# Patient Record
Sex: MALE | Race: WHITE | NOT HISPANIC OR LATINO | URBAN - METROPOLITAN AREA
[De-identification: names, ages, dates, MRNs, and addresses within clinical notes are randomized per-mention and may not be internally consistent; named-entity substitution may affect disease eponyms.]

---

## 2019-08-02 ENCOUNTER — EMERGENCY (EMERGENCY)
Facility: HOSPITAL | Age: 19
LOS: 1 days | Discharge: HOME | End: 2019-08-02
Attending: EMERGENCY MEDICINE
Payer: COMMERCIAL

## 2019-08-02 VITALS
RESPIRATION RATE: 16 BRPM | SYSTOLIC BLOOD PRESSURE: 139 MMHG | DIASTOLIC BLOOD PRESSURE: 82 MMHG | HEART RATE: 106 BPM | TEMPERATURE: 99 F | OXYGEN SATURATION: 96 % | WEIGHT: 199.96 LBS | HEIGHT: 75 IN

## 2019-08-02 VITALS
HEART RATE: 82 BPM | SYSTOLIC BLOOD PRESSURE: 127 MMHG | OXYGEN SATURATION: 98 % | RESPIRATION RATE: 18 BRPM | DIASTOLIC BLOOD PRESSURE: 83 MMHG

## 2019-08-02 DIAGNOSIS — J35.9 CHRONIC DISEASE OF TONSILS AND ADENOIDS, UNSPECIFIED: Chronic | ICD-10-CM

## 2019-08-02 PROCEDURE — 96374 THER/PROPH/DIAG INJ IV PUSH: CPT

## 2019-08-02 PROCEDURE — 73600 X-RAY EXAM OF ANKLE: CPT | Mod: 26,59,LT

## 2019-08-02 PROCEDURE — 73600 X-RAY EXAM OF ANKLE: CPT

## 2019-08-02 PROCEDURE — 99284 EMERGENCY DEPT VISIT MOD MDM: CPT

## 2019-08-02 PROCEDURE — 99284 EMERGENCY DEPT VISIT MOD MDM: CPT | Mod: 25

## 2019-08-02 PROCEDURE — 73630 X-RAY EXAM OF FOOT: CPT | Mod: 26,LT

## 2019-08-02 PROCEDURE — 73590 X-RAY EXAM OF LOWER LEG: CPT | Mod: 26,LT

## 2019-08-02 PROCEDURE — 73610 X-RAY EXAM OF ANKLE: CPT

## 2019-08-02 PROCEDURE — 73590 X-RAY EXAM OF LOWER LEG: CPT

## 2019-08-02 PROCEDURE — 73630 X-RAY EXAM OF FOOT: CPT

## 2019-08-02 PROCEDURE — 73610 X-RAY EXAM OF ANKLE: CPT | Mod: 26,LT,76

## 2019-08-02 RX ORDER — ACETAMINOPHEN 500 MG
650 TABLET ORAL ONCE
Refills: 0 | Status: COMPLETED | OUTPATIENT
Start: 2019-08-02 | End: 2019-08-02

## 2019-08-02 RX ORDER — MORPHINE SULFATE 50 MG/1
4 CAPSULE, EXTENDED RELEASE ORAL ONCE
Refills: 0 | Status: DISCONTINUED | OUTPATIENT
Start: 2019-08-02 | End: 2019-08-02

## 2019-08-02 RX ADMIN — Medication 650 MILLIGRAM(S): at 11:56

## 2019-08-02 RX ADMIN — Medication 650 MILLIGRAM(S): at 12:26

## 2019-08-02 RX ADMIN — MORPHINE SULFATE 4 MILLIGRAM(S): 50 CAPSULE, EXTENDED RELEASE ORAL at 14:40

## 2019-08-02 RX ADMIN — MORPHINE SULFATE 4 MILLIGRAM(S): 50 CAPSULE, EXTENDED RELEASE ORAL at 14:06

## 2019-08-02 NOTE — ED PROVIDER NOTE - PROGRESS NOTE DETAILS
Attending MD Randall: Splinted by ortho.  Post-reduction XR done.  Discussed with ortho, cleared for discharge.  Follow up with Dr. Charlton.  Follow up instructions given, importance of follow up emphasized, return to ED parameters reviewed and patient verbalized understanding.  All questions answered, all concerns addressed.

## 2019-08-02 NOTE — CONSULT NOTE ADULT - SUBJECTIVE AND OBJECTIVE BOX
19y Male community ambulatory presents c/o L ankle pain sp sliding into base while playing baseball game. Denies HS/LOC. Denies numbness/tingling. No other pain/injuries. Denies fevers/chills. Admits to chronic stress factures of lumbar spine as adolescent but denies any further orthopedic history.    HEALTH ISSUES - PROBLEM Dx:        MEDICATIONS  (STANDING):    Allergies    No Known Allergies    Intolerances      Imaging: XR demonstrates L aguirre b ankle fracture    Vital Signs Last 24 Hrs  T(C): 37.2 (08-02-19 @ 11:20), Max: 37.2 (08-02-19 @ 11:20)  T(F): 98.9 (08-02-19 @ 11:20), Max: 98.9 (08-02-19 @ 11:20)  HR: 106 (08-02-19 @ 11:20) (106 - 106)  BP: 139/82 (08-02-19 @ 11:20) (139/82 - 139/82)  BP(mean): --  RR: 16 (08-02-19 @ 11:20) (16 - 16)  SpO2: 96% (08-02-19 @ 11:20) (96% - 96%)    Physical Exam  Gen: Nad  L LE: Skin intact, +TTP lateral malleolus, no bony ttp elsewhere, +ehl/fhl/ta/gs function, +dp/pt pulses palpable. Negative log roll, able to SLR, compartments soft/compressive, extremity warm/well perfused. No knee tenderness or swelling.    Secondary Survey:   RLE/RUE/LUE: No TTP over bony prominences, SILT, palpable pulses, full/painless range of motion, compartments soft    Spine: No bony tenderness. No palpable stepoffs.     Procedure: Pt gave verbal consent to procedure. Risks including pain from procedure, and benefits to relieve pressure on vessels, soft tissue, nerves, and prevent cartilage damage were discussed. After this pt received morphine IV per the ED. Then  10 cc 1% lidocaine injected under sterile procedure into L ankle joint. Time was allowed to achieve anesthetic effect. Placed in well padded trilam splint. Post procedure imaging obtained. Post procedure exam unchanged, NV intact, able to move all toes, SILT distally.

## 2019-08-02 NOTE — ED PROVIDER NOTE - ATTENDING CONTRIBUTION TO CARE
Attending MD Randall: I personally have seen and examined this patient.  Resident note reviewed and agree on plan of care and except where noted.  See below for details.     Seen in [facility], accompanied by mother, Colette    19M with PMH/PSH including exercised induced asthma on PRN Xopinex, stress fractures/compression fractures of back, s/p tonsillectomy and s/p adenoidectomy presents to the ED with L ankle pain s/p sliding into a base on turf.  Reports happened at around 10:45am.  Reports L ankle pain and L foot pain, greatest at the medial plantar aspect of foot and lateral ankle.  Reports heard and felt a pop at the time of incident.  Reports inversion landing.  Denies preceding dizziness, weakness, sensory changes.  Denies LOC, hitting head. Denies change in vision, double vision, sudden loss of vision. Denies chest pain, shortness of breath, palpitations. Denies loss of urinary or bowel continence.  On exam, NAD, head NCAT, PERRL, FROM at neck, no tenderness to midline palpation, no stepoffs along length of spine, lungs CTAB with good inspiratory effort, +S1S2, no m/r/g, abdomen soft with +BS, NT, ND, no CVAT, moving all extremities 5/5 strength in all extremities except for LLE secondary to pain, ROM of L ankle limited secondary to pain but no point tenderness, FROM at L knee, +tenderness to palpation at plantar aspect of foot area at midfoot, unable to bear weight, able to move all toes, +2 DPs, sensory grossly intact; A/P: 19M with L foot and ankle pain s/p inversion injury, will obtain XR to rule out bony injury, reports received Toradol in route by EMS, will give Tylenol here

## 2019-08-02 NOTE — CONSULT NOTE ADULT - CONSULT REQUESTED BY NAME
Encounter Date: 2/20/2019    SCRIBE #1 NOTE: I, Son Opal, am scribing for, and in the presence of,  Dr. Barrios . I have scribed the following portions of the note - the EKG reading.       History     Chief Complaint   Patient presents with    URI     cough, congestion, chest hurts     31 y/o AAM with no medical history presents to the ED c/o URI symptoms x 3 days. He reports dry cough, decreased appetite, rhinorrhea, post nasal drip, headache, lightheadedness, headache, generalized myalgias.  He denies any sick contacts. He did have a flu vaccine this year. He denies f/c, nausea, diarrhea, chest pain, SOB, dysuria. Denies tobacco use.      The history is provided by the patient.     Review of patient's allergies indicates:  No Known Allergies  Past Medical History:   Diagnosis Date    Shoulder dislocation      History reviewed. No pertinent surgical history.  History reviewed. No pertinent family history.  Social History     Tobacco Use    Smoking status: Never Smoker    Smokeless tobacco: Never Used   Substance Use Topics    Alcohol use: No    Drug use: No     Review of Systems   Constitutional: Positive for appetite change (decreased). Negative for chills and fever.   HENT: Positive for ear pain, postnasal drip and rhinorrhea. Negative for congestion and sore throat.    Eyes: Negative for photophobia and visual disturbance.   Respiratory: Positive for cough. Negative for shortness of breath.    Cardiovascular: Negative for chest pain.   Gastrointestinal: Negative for abdominal pain, constipation, diarrhea, nausea and vomiting.   Genitourinary: Negative for dysuria and hematuria.   Musculoskeletal: Positive for myalgias.   Skin: Negative for rash and wound.   Neurological: Positive for light-headedness and headaches. Negative for dizziness, syncope, weakness and numbness.   Psychiatric/Behavioral: Negative for confusion.       Physical Exam     Initial Vitals [02/20/19 1458]   BP Pulse Resp Temp SpO2    122/76 70 18 99.5 °F (37.5 °C) 98 %      MAP       --         Physical Exam    Nursing note and vitals reviewed.  Constitutional: He appears well-developed and well-nourished. He is not diaphoretic. No distress.   HENT:   Head: Normocephalic and atraumatic.   Mouth/Throat: Uvula is midline and mucous membranes are normal. Posterior oropharyngeal edema present. No oropharyngeal exudate, posterior oropharyngeal erythema or tonsillar abscesses.   Neck: Normal range of motion. Neck supple.   Cardiovascular: Normal rate, regular rhythm and normal heart sounds. Exam reveals no gallop and no friction rub.    No murmur heard.  Pulmonary/Chest: Breath sounds normal. He has no wheezes. He has no rhonchi. He has no rales.   Abdominal: Soft. Bowel sounds are normal. There is no tenderness. There is no rebound and no guarding.   Musculoskeletal: Normal range of motion.   Neurological: He is alert and oriented to person, place, and time.   Skin: Skin is warm and dry. No rash noted. No erythema.   Psychiatric: He has a normal mood and affect.         ED Course   Procedures  Labs Reviewed   POCT INFLUENZA A/B MOLECULAR - Abnormal; Notable for the following components:       Result Value    POC Molecular Influenza A Ag Positive (*)     All other components within normal limits     EKG Readings: (Independently Interpreted)   Rhythm: Normal Sinus Rhythm. Heart Rate: 73 bpm . ST Segments: Normal ST Segments. T Waves: Normal. Axis: Normal.     ECG Results          EKG 12-lead (In process)  Result time 02/20/19 15:19:49    In process by Interface, Lab In [facility] (02/20/19 15:19:49)                 Narrative:    Test Reason : R07.9,    Vent. Rate : 073 BPM     Atrial Rate : 073 BPM     P-R Int : 124 ms          QRS Dur : 086 ms      QT Int : 372 ms       P-R-T Axes : 077 083 062 degrees     QTc Int : 409 ms    Normal sinus rhythm  Normal ECG  No previous ECGs available    Referred By: [email or web address]   SELF           Confirmed By:            ED                   Imaging Results    None          Medical Decision Making:   History:   Old Medical Records: I decided to obtain old medical records.  Clinical Tests:   Lab Tests: Reviewed and Ordered  Medical Tests: Reviewed and Ordered       APC / Resident Notes:   29 y/o AAM with no medical history presents to the ED c/o URI symptoms x 3 days. VSS. RRR. Lungs clear. Abdomen soft and nontender. No posterior oropharyngeal erythema or edema. DDx includes but is not limited to influenza vs viral URI. I do not suspect pneumonia.    Influenza A positive.    I do not feel that he needs any further labs or imaging at this time. Stable for discharge.    He was discharged with prescriptions for tamiflu and tessalon.  He will follow up with his PCP.  All of the patient's questions were answered.  I reviewed the patient's chart and labs and discussed the case with my supervising physician.          Scribe Attestation:   Scribe #1: I performed the above scribed service and the documentation accurately describes the services I performed. I attest to the accuracy of the note.    Attending Attestation:     Physician Attestation Statement for NP/PA:   I discussed this assessment and plan of this patient with the NP/PA, but I did not personally examine the patient. The face to face encounter was performed by the NP/PA.        Physician Attestation for Scribe:      Comments: I, Dr. Luis Manuel Barrios, personally performed the services described in this documentation. All medical record entries made by the scribe were at my direction and in my presence.  I have reviewed the chart and agree that the record reflects my personal performance and is accurate and complete. Luis Manuel Barrios MD.  6:42 PM 02/20/2019                 Clinical Impression:       ICD-10-CM ICD-9-CM   1. Influenza A J10.1 487.1   2. Cough R05 786.2         Disposition:   Disposition: Discharged  Condition: Stable                        Adelina Cabrera PA-C  02/20/19  2020

## 2019-08-02 NOTE — ED PROVIDER NOTE - PHYSICAL EXAMINATION
Gen: Alert and oriented. Answering questions appropriately  HEENT: extra occular movements intact, no nasal discharge, mucous membranes moist  CV: skin well perfused  Resp: normal work of breathing  MSK: swelling at left ankle and hind food. Tenderness to palpation at ankle and foot.   Neuro: A&Ox4, following commands, moving all four extremities spontaneously, No sensory deficits at left ankle or foot. Strength exam of left foot limited by pain Gen: Alert and oriented. Answering questions appropriately  HEENT: extra occular movements intact, no nasal discharge, mucous membranes moist  CV: skin well perfused  Resp: normal work of breathing  Skin: no openings in skin observed  MSK: swelling at left ankle and hind food. Tenderness to palpation at ankle and foot. .   Neuro: A&Ox4, following commands, moving all four extremities spontaneously, No sensory deficits at left ankle or foot. Strength exam of left foot limited by pain, decreased ROM at ankle joint

## 2019-08-02 NOTE — ED PROVIDER NOTE - CLINICAL SUMMARY MEDICAL DECISION MAKING FREE TEXT BOX
19 year old male with no pmh presenting with ankle pain. VS remarkable for tachycardia to 106. PE exam remarkable for tenderness to palpation at left ankle and left hindfoot. Edema is present at left ankle and left hind foot. Xray shows fracture. Ortho consulted and currently at bedside. 19 year old male with no pmh presenting with ankle pain. VS remarkable for tachycardia to 106. PE exam remarkable for tenderness to palpation at left ankle and left hindfoot. Edema is present at left ankle and left hind foot. Otherwise, neurovascularly in tact.  Xray shows fracture. Ortho consulted and currently at bedside. tachycardia most likely secondary to pain.    Update- Patient seen by ortho and reduced. Cleared for discharge. Will have follow up with outpatient ortho. Educated on reasons to come back to emergency department. Patient given crutches and able to ambulate.

## 2019-08-02 NOTE — ED PROVIDER NOTE - NS ED ROS FT
Gen: Denies fever, weight loss  Skin: denies any cuts in skin or bone sticking out of skin  Resp: Denies SOB,   GI: Denies nausea, vomiting  Msk: +left ankle pain  Neuro: Denies LOC, weakness

## 2019-08-02 NOTE — ED PROVIDER NOTE - OBJECTIVE STATEMENT
19 year old male no pmh who presents for left ankle pain. Patient was playing baseball and slid into second base when he suddenly felt ankle pain and heard a popping sound. Foot and ankle began to swell afterword. In the field, leg was splinted and Toradol was given. Currently, patient endorses continuing left ankle pain. Denies any sensory changes in his foot. Denies any bone sticking out of skin.

## 2019-08-02 NOTE — CONSULT NOTE ADULT - ASSESSMENT
A/P: 19y Male with L ankle fracture  Pain control  NWB L LE in splint, keep c/d/I, cane/crutches/walker as needed  Ice/elevation  Si/sx compartment syndrome discussed with patient, told to return to ED if exhibit any  Possible need for surgical intervention in future discussed, all questions answered  Follow up with Dr. Charlton within 1 week, call office for appointment  Ortho stable

## 2019-08-02 NOTE — ED PROVIDER NOTE - CARE PROVIDER_API CALL
Munir Charlton (MD)  Orthopaedic Surgery  [address] [place] [address], [address]  [place] [address]  Phone: [phone]  Fax: [phone]  Follow Up Time: 4-6 Days

## 2020-06-18 ENCOUNTER — APPOINTMENT (OUTPATIENT)
Dept: PSYCHIATRY | Facility: CLINIC | Age: 20
End: 2020-06-18
Payer: COMMERCIAL

## 2020-06-18 PROCEDURE — 99205 OFFICE O/P NEW HI 60 MIN: CPT

## 2020-07-09 ENCOUNTER — APPOINTMENT (OUTPATIENT)
Dept: PSYCHIATRY | Facility: CLINIC | Age: 20
End: 2020-07-09
Payer: COMMERCIAL

## 2020-07-09 PROCEDURE — 99214 OFFICE O/P EST MOD 30 MIN: CPT

## 2020-10-09 ENCOUNTER — APPOINTMENT (OUTPATIENT)
Dept: PSYCHIATRY | Facility: CLINIC | Age: 20
End: 2020-10-09
Payer: COMMERCIAL

## 2020-10-09 PROCEDURE — 99214 OFFICE O/P EST MOD 30 MIN: CPT

## 2021-04-05 ENCOUNTER — APPOINTMENT (OUTPATIENT)
Dept: PSYCHIATRY | Facility: CLINIC | Age: 21
End: 2021-04-05

## 2021-05-12 ENCOUNTER — APPOINTMENT (OUTPATIENT)
Dept: PSYCHIATRY | Facility: CLINIC | Age: 21
End: 2021-05-12
Payer: COMMERCIAL

## 2021-05-12 PROCEDURE — 99214 OFFICE O/P EST MOD 30 MIN: CPT | Mod: 95

## 2021-08-20 ENCOUNTER — APPOINTMENT (OUTPATIENT)
Dept: PSYCHIATRY | Facility: CLINIC | Age: 21
End: 2021-08-20
Payer: COMMERCIAL

## 2021-08-20 PROCEDURE — 99214 OFFICE O/P EST MOD 30 MIN: CPT | Mod: 95

## 2022-03-24 ENCOUNTER — APPOINTMENT (OUTPATIENT)
Dept: PSYCHIATRY | Facility: CLINIC | Age: 22
End: 2022-03-24
Payer: COMMERCIAL

## 2022-03-24 DIAGNOSIS — F42.8 OTHER OBSESSIVE COMPULSIVE DISORDER: ICD-10-CM

## 2022-03-24 DIAGNOSIS — F41.9 ANXIETY DISORDER, UNSPECIFIED: ICD-10-CM

## 2022-03-24 DIAGNOSIS — F41.0 PANIC DISORDER [EPISODIC PAROXYSMAL ANXIETY]: ICD-10-CM

## 2022-03-24 PROCEDURE — 99214 OFFICE O/P EST MOD 30 MIN: CPT | Mod: 95

## 2022-07-05 RX ORDER — CLOMIPRAMINE HYDROCHLORIDE 50 MG/1
50 CAPSULE ORAL DAILY
Qty: 30 | Refills: 0 | Status: ACTIVE | COMMUNITY
Start: 2020-06-18 | End: 1900-01-01

## 2022-07-05 RX ORDER — FLUVOXAMINE MALEATE 150 MG/1
150 CAPSULE, EXTENDED RELEASE ORAL
Qty: 60 | Refills: 0 | Status: ACTIVE | COMMUNITY
Start: 2020-10-09 | End: 1900-01-01

## 2022-07-26 ENCOUNTER — APPOINTMENT (OUTPATIENT)
Dept: PSYCHIATRY | Facility: CLINIC | Age: 22
End: 2022-07-26

## 2024-07-29 ENCOUNTER — APPOINTMENT (OUTPATIENT)
Dept: HEART AND VASCULAR | Facility: CLINIC | Age: 24
End: 2024-07-29
Payer: COMMERCIAL

## 2024-07-29 VITALS
HEIGHT: 75 IN | OXYGEN SATURATION: 98 % | DIASTOLIC BLOOD PRESSURE: 90 MMHG | WEIGHT: 170 LBS | TEMPERATURE: 97.6 F | SYSTOLIC BLOOD PRESSURE: 128 MMHG | BODY MASS INDEX: 21.14 KG/M2 | HEART RATE: 73 BPM

## 2024-07-29 VITALS — SYSTOLIC BLOOD PRESSURE: 118 MMHG | DIASTOLIC BLOOD PRESSURE: 80 MMHG

## 2024-07-29 DIAGNOSIS — R07.9 CHEST PAIN, UNSPECIFIED: ICD-10-CM

## 2024-07-29 DIAGNOSIS — R06.09 OTHER FORMS OF DYSPNEA: ICD-10-CM

## 2024-07-29 DIAGNOSIS — J45.909 UNSPECIFIED ASTHMA, UNCOMPLICATED: ICD-10-CM

## 2024-07-29 DIAGNOSIS — J30.2 OTHER SEASONAL ALLERGIC RHINITIS: ICD-10-CM

## 2024-07-29 DIAGNOSIS — Z78.9 OTHER SPECIFIED HEALTH STATUS: ICD-10-CM

## 2024-07-29 PROCEDURE — 93000 ELECTROCARDIOGRAM COMPLETE: CPT

## 2024-07-29 NOTE — DISCUSSION/SUMMARY
[Patient] : the patient [EKG obtained to assist in diagnosis and management of assessed problem(s)] : EKG obtained to assist in diagnosis and management of assessed problem(s) [___ Month(s)] : in [unfilled] month(s) [FreeTextEntry1] : 23 y/o male with h/o anxiety, ocd, asthma who presents for initial evaluation today  -ekg ordered today - nsr, normal intervals, no st/t changes -labs ordered today  -echo ordered for cp, sob -ordered ETT for cp, sob  -pulm referral for h/o possible asthma -counseled on cvd risk factors -f/up 2-3 months for cp, sob  I have spent 60 minutes reviewing labs, records, tests and discussed cvd risk factors

## 2024-07-29 NOTE — HISTORY OF PRESENT ILLNESS
[FreeTextEntry1] : 25 y/o male with h/o anxiety, ocd, asthma who presents for initial evaluation today  notes chest tightness w left arm discomfort 2/10, lasts 30 minutes, associated w sob - started over past in past year, occurs a few times/week notes sob < 1 year  no syncope, lh, palpitations, edema, orthopnea, pnd  notes fatigue for past year  notes elevated BP at MD offices  notes possible h/o exercise induced asthma  not exercising actively, walks for exercise diet healthy lost weight 20 lbs in past 2 years  sees therapist for anxiety high stress sleep 7-8 hours  PMH/PSH: anxiety ocd panic s/p appy s/p tonsillectomy/adenoidectomy s/p ankle surgery asthma seasonal allergies  ALL: nkda  MEDS: none   SH: no tobacco/etoh/drugs smokes marijuana - 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

## 2024-07-30 LAB
ALBUMIN SERPL ELPH-MCNC: 4.9 G/DL
ALP BLD-CCNC: 75 U/L
ALT SERPL-CCNC: 16 U/L
ANION GAP SERPL CALC-SCNC: 13 MMOL/L
APPEARANCE: CLEAR
AST SERPL-CCNC: 15 U/L
BACTERIA: NEGATIVE /HPF
BASOPHILS # BLD AUTO: 0.04 K/UL
BASOPHILS NFR BLD AUTO: 0.9 %
BILIRUB SERPL-MCNC: 0.4 MG/DL
BILIRUBIN URINE: NEGATIVE
BLOOD URINE: NEGATIVE
BUN SERPL-MCNC: 15 MG/DL
CALCIUM SERPL-MCNC: 10.1 MG/DL
CAST: 0 /LPF
CHLORIDE SERPL-SCNC: 104 MMOL/L
CHOLEST SERPL-MCNC: 192 MG/DL
CO2 SERPL-SCNC: 24 MMOL/L
COLOR: YELLOW
CREAT SERPL-MCNC: 0.97 MG/DL
EGFR: 111 ML/MIN/1.73M2
EOSINOPHIL # BLD AUTO: 0.24 K/UL
EOSINOPHIL NFR BLD AUTO: 5.3 %
EPITHELIAL CELLS: 0 /HPF
ESTIMATED AVERAGE GLUCOSE: 108 MG/DL
GLUCOSE QUALITATIVE U: NEGATIVE MG/DL
GLUCOSE SERPL-MCNC: 89 MG/DL
HBA1C MFR BLD HPLC: 5.4 %
HCT VFR BLD CALC: 47.4 %
HDLC SERPL-MCNC: 49 MG/DL
HGB BLD-MCNC: 16.3 G/DL
IMM GRANULOCYTES NFR BLD AUTO: 0.2 %
KETONES URINE: NEGATIVE MG/DL
LDLC SERPL CALC-MCNC: 124 MG/DL
LEUKOCYTE ESTERASE URINE: NEGATIVE
LYMPHOCYTES # BLD AUTO: 1.41 K/UL
LYMPHOCYTES NFR BLD AUTO: 31 %
MAGNESIUM SERPL-MCNC: 2.1 MG/DL
MAN DIFF?: NORMAL
MCHC RBC-ENTMCNC: 31.3 PG
MCHC RBC-ENTMCNC: 34.4 GM/DL
MCV RBC AUTO: 91 FL
MICROSCOPIC-UA: NORMAL
MONOCYTES # BLD AUTO: 0.31 K/UL
MONOCYTES NFR BLD AUTO: 6.8 %
NEUTROPHILS # BLD AUTO: 2.54 K/UL
NEUTROPHILS NFR BLD AUTO: 55.8 %
NITRITE URINE: NEGATIVE
NONHDLC SERPL-MCNC: 143 MG/DL
PH URINE: 5.5
PLATELET # BLD AUTO: 229 K/UL
POTASSIUM SERPL-SCNC: 5.1 MMOL/L
PROT SERPL-MCNC: 7.4 G/DL
PROTEIN URINE: NEGATIVE MG/DL
RBC # BLD: 5.21 M/UL
RBC # FLD: 12.2 %
RED BLOOD CELLS URINE: 1 /HPF
SODIUM SERPL-SCNC: 141 MMOL/L
SPECIFIC GRAVITY URINE: 1.02
TRIGL SERPL-MCNC: 107 MG/DL
TSH SERPL-ACNC: 2.93 UIU/ML
UROBILINOGEN URINE: 0.2 MG/DL
WBC # FLD AUTO: 4.55 K/UL
WHITE BLOOD CELLS URINE: 0 /HPF

## 2024-08-01 LAB — APO LP(A) SERPL-MCNC: 9.2 NMOL/L
